# Patient Record
Sex: FEMALE | Race: WHITE | NOT HISPANIC OR LATINO
[De-identification: names, ages, dates, MRNs, and addresses within clinical notes are randomized per-mention and may not be internally consistent; named-entity substitution may affect disease eponyms.]

---

## 2022-08-17 ENCOUNTER — APPOINTMENT (OUTPATIENT)
Dept: CARDIOLOGY | Facility: CLINIC | Age: 52
End: 2022-08-17

## 2022-08-17 VITALS — DIASTOLIC BLOOD PRESSURE: 82 MMHG | SYSTOLIC BLOOD PRESSURE: 122 MMHG

## 2022-08-17 VITALS
TEMPERATURE: 97.5 F | DIASTOLIC BLOOD PRESSURE: 72 MMHG | HEART RATE: 79 BPM | BODY MASS INDEX: 32.78 KG/M2 | SYSTOLIC BLOOD PRESSURE: 120 MMHG | HEIGHT: 63 IN | OXYGEN SATURATION: 97 % | WEIGHT: 185 LBS

## 2022-08-17 DIAGNOSIS — Z80.3 FAMILY HISTORY OF MALIGNANT NEOPLASM OF BREAST: ICD-10-CM

## 2022-08-17 DIAGNOSIS — Z13.6 ENCOUNTER FOR SCREENING FOR CARDIOVASCULAR DISORDERS: ICD-10-CM

## 2022-08-17 DIAGNOSIS — Z78.9 OTHER SPECIFIED HEALTH STATUS: ICD-10-CM

## 2022-08-17 DIAGNOSIS — F17.210 NICOTINE DEPENDENCE, CIGARETTES, UNCOMPLICATED: ICD-10-CM

## 2022-08-17 DIAGNOSIS — Z84.89 FAMILY HISTORY OF OTHER SPECIFIED CONDITIONS: ICD-10-CM

## 2022-08-17 DIAGNOSIS — Z00.00 ENCOUNTER FOR GENERAL ADULT MEDICAL EXAMINATION W/OUT ABNORMAL FINDINGS: ICD-10-CM

## 2022-08-17 DIAGNOSIS — Z83.3 FAMILY HISTORY OF DIABETES MELLITUS: ICD-10-CM

## 2022-08-17 DIAGNOSIS — R00.2 PALPITATIONS: ICD-10-CM

## 2022-08-17 PROCEDURE — 99406 BEHAV CHNG SMOKING 3-10 MIN: CPT

## 2022-08-17 PROCEDURE — 93242 EXT ECG>48HR<7D RECORDING: CPT | Mod: 59

## 2022-08-17 PROCEDURE — 99204 OFFICE O/P NEW MOD 45 MIN: CPT

## 2022-08-17 RX ORDER — SPIRONOLACTONE 50 MG/1
50 TABLET ORAL
Qty: 90 | Refills: 0 | Status: DISCONTINUED | COMMUNITY
Start: 2022-05-03 | End: 2022-08-17

## 2022-08-17 NOTE — DISCUSSION/SUMMARY
[FreeTextEntry1] : 52-year-old female with above medical history and active medical problems as noted below\par 1.  Dyspnea on exertion as well as shortness of breath at rest.  Recent CT angio and chest x-ray negative for pulmonary embolism.  Active smoking.  CAD risk factors includes essential hypertension, active smoking.\par Obesity.  No clinical signs of volume overload.  Warm extremities.\par Recommendation includes labs.\par Echocardiogram for LV ejection fraction wall motion pericardial space and pulmonary artery systolic pressure evaluation specifically in presence of murmur, exertional dyspnea/shortness of breath hypertension.\par Exercise treadmill stress test in presence of abnormality of the EKG, multiple CAD risk factors and exertional shortness of breath.\par If about test does not show any significant cardiovascular etiology pulmonary evaluation which includes pulmonary function tests and follow-up on pulmonary nodule.\par Low-salt diet decrease in saturated fat carbohydrate intake increasing exercise weight reduction is strongly recommended.\par 2.  Essential hypertension very well controlled continue with losartan at present.  Goal less than 130/80.  Smoking cessation.\par Weight reduction.\par Low-salt diet\par 3.  Unclear lipid panel recommend a fasting lipid panel.  For restratification\par 4.  Obesity weight reduction\par 5.  Smoking.  Smoking cessation. Counseling done. Relationship with ASCVD, and associated morbidity, mortality was reviewed. Options available discussed.\par 6.  Lung nodule follow-up with pulmonary physician which has been already scheduled.\par #7 extrasystolic arrhythmias.  3-day event monitor ordered.\par Counseling regarding low saturated fat, salt and carbohydrate intake was reviewed. Active lifestyle and regular. Exercise along with weight management is advised.\par All the above were at length reviewed. Answered all the questions. Thank you very much for this kind referral. Please do not hesitate to give me a call for any question.\par Part of this transcription was done with voice recognition software and phonetically similar errors are common. I apologize for that. Please do not hesitate to call for any questions due to above.\par \par Sincerely,\par Cm Hassan MD,FACC,PIA\par

## 2022-08-17 NOTE — PHYSICAL EXAM
[Well Developed] : well developed [Well Nourished] : well nourished [No Acute Distress] : no acute distress [Obese] : obese [Normal Conjunctiva] : normal conjunctiva [Normal Venous Pressure] : normal venous pressure [No Carotid Bruit] : no carotid bruit [Normal S1, S2] : normal S1, S2 [No Rub] : no rub [No Gallop] : no gallop [Clear Lung Fields] : clear lung fields [Good Air Entry] : good air entry [No Respiratory Distress] : no respiratory distress  [Soft] : abdomen soft [Normal Gait] : normal gait [No Edema] : no edema [No Cyanosis] : no cyanosis [No Clubbing] : no clubbing [No Varicosities] : no varicosities [No Rash] : no rash [Moves all extremities] : moves all extremities [Normal Speech] : normal speech [Alert and Oriented] : alert and oriented [de-identified] : Midsystolic murmur.  At the base.  2/6.  Intermittent irregular beats

## 2022-08-17 NOTE — CARDIOLOGY SUMMARY
[de-identified] : 8/4/2022 outside EKG normal sinus rhythm left axis deviation LVH nonspecific IVCD.

## 2022-08-17 NOTE — ASSESSMENT
[FreeTextEntry1] : Reviewed on August 17, 2022.\par EKG from 8/4/2022 as noted above\par 2.  CT angio of the chest 8/4/2022.  No evidence of pulmonary embolus.  5.6 x 6.3 mm lung nodule right lung base.  Liver cyst.  No pericardial effusion.  They did not mention any significant evidence of vascular calcification.\par Chest x-ray no active cardiopulmonary disease.\par Sodium 140 potassium 3.6 creatinine 0.67.   COVID test was negative

## 2022-08-17 NOTE — REASON FOR VISIT
[Symptom and Test Evaluation] : symptom and test evaluation [Hypertension] : hypertension [FreeTextEntry3] : Dr. Keith [FreeTextEntry1] : 52-year-old white female is referred to me for consultation for worsening shortness of breath and recent emergency room visit for difficulty breathing.\par I have reviewed emergency room visit from [facility] dated 8/4/2022 with her.\par She has exertional and resting shortness of breath.  Without any associated chest pain.  No PND orthopnea.  She does have intermittent skipping beats and palpitations which are worse recently.  She has no significant dizziness near syncopal or syncopal event.\par She has no claudication pain.  She does not do regular exercise.  She has significant increased weight.\par She continues to smoke.\par No significant alcohol intake.\par \par Medical history includes\par Essential hypertension no history of CHF no history of CKD.  Active smoking\par Obesity\par

## 2022-08-24 ENCOUNTER — APPOINTMENT (OUTPATIENT)
Dept: MRI IMAGING | Facility: CLINIC | Age: 52
End: 2022-08-24

## 2022-08-24 ENCOUNTER — APPOINTMENT (OUTPATIENT)
Dept: CARDIOLOGY | Facility: CLINIC | Age: 52
End: 2022-08-24

## 2022-08-24 PROCEDURE — 93244 EXT ECG>48HR<7D REV&INTERPJ: CPT

## 2022-08-31 ENCOUNTER — APPOINTMENT (OUTPATIENT)
Dept: MRI IMAGING | Facility: CLINIC | Age: 52
End: 2022-08-31
Payer: COMMERCIAL

## 2022-08-31 ENCOUNTER — RESULT REVIEW (OUTPATIENT)
Age: 52
End: 2022-08-31

## 2022-08-31 PROCEDURE — 74183 MRI ABD W/O CNTR FLWD CNTR: CPT

## 2022-08-31 PROCEDURE — A9585: CPT | Mod: NC,JW

## 2022-09-07 ENCOUNTER — APPOINTMENT (OUTPATIENT)
Dept: CARDIOLOGY | Facility: CLINIC | Age: 52
End: 2022-09-07

## 2022-09-07 DIAGNOSIS — R06.02 SHORTNESS OF BREATH: ICD-10-CM

## 2022-09-07 PROCEDURE — 93015 CV STRESS TEST SUPVJ I&R: CPT

## 2022-09-07 PROCEDURE — 93306 TTE W/DOPPLER COMPLETE: CPT

## 2022-09-14 ENCOUNTER — APPOINTMENT (OUTPATIENT)
Dept: CT IMAGING | Facility: CLINIC | Age: 52
End: 2022-09-14

## 2022-09-14 ENCOUNTER — RESULT REVIEW (OUTPATIENT)
Age: 52
End: 2022-09-14

## 2022-09-14 PROCEDURE — 71260 CT THORAX DX C+: CPT

## 2022-09-28 ENCOUNTER — APPOINTMENT (OUTPATIENT)
Dept: CARDIOLOGY | Facility: CLINIC | Age: 52
End: 2022-09-28

## 2022-09-28 VITALS
OXYGEN SATURATION: 96 % | HEART RATE: 87 BPM | HEIGHT: 63 IN | WEIGHT: 191 LBS | BODY MASS INDEX: 33.84 KG/M2 | SYSTOLIC BLOOD PRESSURE: 114 MMHG | DIASTOLIC BLOOD PRESSURE: 64 MMHG

## 2022-09-28 DIAGNOSIS — R07.89 OTHER CHEST PAIN: ICD-10-CM

## 2022-09-28 DIAGNOSIS — I49.49 OTHER PREMATURE DEPOLARIZATION: ICD-10-CM

## 2022-09-28 PROCEDURE — 99214 OFFICE O/P EST MOD 30 MIN: CPT

## 2022-09-28 RX ORDER — NALTREXONE HYDROCHLORIDE 50 MG/1
50 TABLET, FILM COATED ORAL
Qty: 30 | Refills: 0 | Status: DISCONTINUED | COMMUNITY
Start: 2022-05-03 | End: 2022-09-28

## 2022-09-28 RX ORDER — ALPRAZOLAM 1 MG/1
1 TABLET, EXTENDED RELEASE ORAL
Qty: 30 | Refills: 0 | Status: DISCONTINUED | COMMUNITY
Start: 2022-05-03 | End: 2022-09-28

## 2022-09-28 NOTE — PHYSICAL EXAM
[Well Developed] : well developed [Well Nourished] : well nourished [No Acute Distress] : no acute distress [Obese] : obese [Normal Venous Pressure] : normal venous pressure [No Carotid Bruit] : no carotid bruit [Normal S1, S2] : normal S1, S2 [No Respiratory Distress] : no respiratory distress  [Normal Gait] : normal gait [No Edema] : no edema [No Cyanosis] : no cyanosis [No Clubbing] : no clubbing [No Rash] : no rash [Moves all extremities] : moves all extremities [Normal Speech] : normal speech [Alert and Oriented] : alert and oriented [Normal Radial B/L] : normal radial B/L [de-identified] : Midsystolic murmur.  At the base.  2/6.  Intermittent irregular beats

## 2022-09-28 NOTE — CARDIOLOGY SUMMARY
[de-identified] : 8/4/2022 outside EKG normal sinus rhythm left axis deviation LVH nonspecific IVCD. [de-identified] : Event monitor.  Almost 2 days.  Normal sinus rhythm average 68.  Occasional ectopy [de-identified] : September 7, 2022 7 minutes 30 seconds of Wes protocol.   chest pain.  8 METs of workload.  No evidence of exercise-induced ischemia. [de-identified] : September 7, 2022 EF 55 to 60% minimal mitral and aortic regurgitation moderately dilated left atrium mild left ventricular enlargement

## 2022-09-28 NOTE — REASON FOR VISIT
[Symptom and Test Evaluation] : symptom and test evaluation [Hypertension] : hypertension [FreeTextEntry3] : Dr. Keith [FreeTextEntry1] : 52-year-old female comes in for a consultation to review her cardiovascular test.  She was seen recently on August 17 in consultation for worsening shortness of breath and recent emergency room visit for difficulty breathing.\par I have reviewed emergency room visit from [facility] dated 8/4/2022 with her.\par She has exertional and resting shortness of breath.  Without any associated chest pain.  No PND orthopnea.  She does have intermittent skipping beats and palpitations which are worse recently.  She has no significant dizziness near syncopal or syncopal event.\par She has no claudication pain.  She does not do regular exercise.  She has significant increased weight.\par She continues to smoke.\par No significant alcohol intake.\par \par Medical history includes\par Essential hypertension no history of CHF no history of CKD.  Active smoking\par Obesity\par

## 2022-09-28 NOTE — ASSESSMENT
[FreeTextEntry1] : Reviewed on August 17, 2022.\par EKG from 8/4/2022 as noted above\par 2.  CT angio of the chest 8/4/2022.  No evidence of pulmonary embolus.  5.6 x 6.3 mm lung nodule right lung base.  Liver cyst.  No pericardial effusion.  They did not mention any significant evidence of vascular calcification.\par Chest x-ray no active cardiopulmonary disease.\par Sodium 140 potassium 3.6 creatinine 0.67.   COVID test was negative\par \par Reviewed on September 28, 2022.\par Echocardiogram/Holter monitor/exercise treadmill stress test were reviewed.

## 2022-09-28 NOTE — DISCUSSION/SUMMARY
[FreeTextEntry1] : 52-year-old female with above medical history and active medical problems as noted below\par 1.  Dyspnea on exertion as well as shortness of breath at rest.  Recent CT angio and chest x-ray negative for pulmonary embolism.  Active smoking.  CAD risk factors includes essential hypertension, active smoking.\par Obesity.  No clinical signs of volume overload.  Warm extremities.\par Echocardiogram shows mild cardiomegaly, left atrial enlargement with preserved EF.\par Exercise treadmill stress test she had chest pain.  EKGs were unchanged at that time.\par Holter monitor showed extrasystolic arrhythmia.  In the form of PVCs PACs.  No significant sustained abnormality\par Labs are pending.\par In presence of cardiomegaly chamber enlargement of unclear etiology, exercise-induced chest pain in presence of limitation of treadmill exercise stress test and ruling out significant obstructive CAD with multiple risk factors I have recommended her coronary CTA/heart with contrast to assess evaluate\par Coronary anatomy rule out any significant obstructive CAD\par Cardiac chamber dimension and ejection fraction\par Based on above test we will discuss further regarding evaluation and management of risk factors from preventive cardiovascular point of view.\par While evaluation is in progress I have also recommended her to take aspirin 81 mg once daily.\par 2.  Essential hypertension very well controlled continue with losartan at present.  Goal less than 130/80.  Smoking cessation.\par Weight reduction.\par Low-salt diet\par 3.  Unclear lipid panel recommend a fasting lipid panel.  For restratification\par 4.  Obesity weight reduction\par 5.  Smoking.  Smoking cessation. Counseling done. Relationship with ASCVD, and associated morbidity, mortality was reviewed. Options available discussed.\par 6.  Lung nodule.  Stable on repeat CT scan\par #7 extrasystolic arrhythmias.  Nonspecific.  Decided against starting any medication at present.\par \par Counseling regarding low saturated fat, salt and carbohydrate intake was reviewed. Active lifestyle and regular. Exercise along with weight management is advised.\par All the above were at length reviewed. Answered all the questions. Thank you very much for this kind referral. Please do not hesitate to give me a call for any question.\par Part of this transcription was done with voice recognition software and phonetically similar errors are common. I apologize for that. Please do not hesitate to call for any questions due to above.\par \par Sincerely,\par Cm Hassan MD,FACC,FASE\par

## 2022-10-14 ENCOUNTER — NON-APPOINTMENT (OUTPATIENT)
Age: 52
End: 2022-10-14

## 2022-11-18 ENCOUNTER — NON-APPOINTMENT (OUTPATIENT)
Age: 52
End: 2022-11-18

## 2023-01-03 ENCOUNTER — NON-APPOINTMENT (OUTPATIENT)
Age: 53
End: 2023-01-03

## 2023-01-07 ENCOUNTER — NON-APPOINTMENT (OUTPATIENT)
Age: 53
End: 2023-01-07

## 2023-03-05 ENCOUNTER — NON-APPOINTMENT (OUTPATIENT)
Age: 53
End: 2023-03-05

## 2023-04-05 ENCOUNTER — APPOINTMENT (OUTPATIENT)
Dept: FAMILY MEDICINE | Facility: CLINIC | Age: 53
End: 2023-04-05
Payer: COMMERCIAL

## 2023-04-05 ENCOUNTER — NON-APPOINTMENT (OUTPATIENT)
Age: 53
End: 2023-04-05

## 2023-04-05 VITALS
WEIGHT: 210 LBS | OXYGEN SATURATION: 97 % | TEMPERATURE: 98 F | BODY MASS INDEX: 37.21 KG/M2 | HEART RATE: 89 BPM | DIASTOLIC BLOOD PRESSURE: 90 MMHG | SYSTOLIC BLOOD PRESSURE: 124 MMHG | RESPIRATION RATE: 16 BRPM | HEIGHT: 63 IN

## 2023-04-05 PROCEDURE — G0444 DEPRESSION SCREEN ANNUAL: CPT | Mod: 59

## 2023-04-05 PROCEDURE — 99386 PREV VISIT NEW AGE 40-64: CPT | Mod: 25

## 2023-04-05 RX ORDER — LINACLOTIDE 290 UG/1
290 CAPSULE, GELATIN COATED ORAL
Qty: 90 | Refills: 0 | Status: DISCONTINUED | COMMUNITY
Start: 2022-05-03 | End: 2023-04-05

## 2023-04-05 NOTE — HISTORY OF PRESENT ILLNESS
[FreeTextEntry8] : DYANA is here to establish care with us as pt used to go to [facility] office \par Here to discuss sleep apnea and restless leg syndrome \par NKDa \par [facility] pharmacy

## 2023-04-05 NOTE — PHYSICAL EXAM
[No Acute Distress] : no acute distress [Well Nourished] : well nourished [Well Developed] : well developed [Well-Appearing] : well-appearing [Normal Sclera/Conjunctiva] : normal sclera/conjunctiva [PERRL] : pupils equal round and reactive to light [EOMI] : extraocular movements intact [Normal Outer Ear/Nose] : the outer ears and nose were normal in appearance [No JVD] : no jugular venous distention [No Lymphadenopathy] : no lymphadenopathy [Supple] : supple [Thyroid Normal, No Nodules] : the thyroid was normal and there were no nodules present [No Respiratory Distress] : no respiratory distress  [No Accessory Muscle Use] : no accessory muscle use [Clear to Auscultation] : lungs were clear to auscultation bilaterally [Normal Rate] : normal rate  [Regular Rhythm] : with a regular rhythm [Normal S1, S2] : normal S1 and S2 [No Murmur] : no murmur heard [Pedal Pulses Present] : the pedal pulses are present [No Edema] : there was no peripheral edema [Soft] : abdomen soft [Non Tender] : non-tender [Non-distended] : non-distended [Normal Posterior Cervical Nodes] : no posterior cervical lymphadenopathy [Normal Anterior Cervical Nodes] : no anterior cervical lymphadenopathy [No CVA Tenderness] : no CVA  tenderness [No Spinal Tenderness] : no spinal tenderness [No Joint Swelling] : no joint swelling [Grossly Normal Strength/Tone] : grossly normal strength/tone [No Rash] : no rash [Coordination Grossly Intact] : coordination grossly intact [No Focal Deficits] : no focal deficits [Normal Gait] : normal gait [Deep Tendon Reflexes (DTR)] : deep tendon reflexes were 2+ and symmetric [Normal Affect] : the affect was normal [Alert and Oriented x3] : oriented to person, place, and time [Normal Mood] : the mood was normal [Normal Insight/Judgement] : insight and judgment were intact

## 2023-04-05 NOTE — HEALTH RISK ASSESSMENT
[Yes] : Yes [Monthly or less (1 pt)] : Monthly or less (1 point) [1 or 2 (0 pts)] : 1 or 2 (0 points) [Never (0 pts)] : Never (0 points) [No] : In the past 12 months have you used drugs other than those required for medical reasons? No [0] : 1) Little interest or pleasure doing things: Not at all (0) [1] : 2) Feeling down, depressed, or hopeless for several days (1) [PHQ-2 Negative - No further assessment needed] : PHQ-2 Negative - No further assessment needed [Current] : Current [10-14] : 10-14 [Audit-CScore] : 1 [[id number]] : 1

## 2023-04-05 NOTE — PLAN
[FreeTextEntry1] : Rx: Sleep study. \par Rx: pramipexole 0.125 1 po QHS # 30\par RTO 1-3 months. \par Discussed loosing weight. \par Rx: Ozempic\par Will obtain blood work.

## 2023-04-26 ENCOUNTER — APPOINTMENT (OUTPATIENT)
Dept: CARDIOLOGY | Facility: CLINIC | Age: 53
End: 2023-04-26
Payer: COMMERCIAL

## 2023-05-10 ENCOUNTER — APPOINTMENT (OUTPATIENT)
Dept: FAMILY MEDICINE | Facility: CLINIC | Age: 53
End: 2023-05-10
Payer: COMMERCIAL

## 2023-05-10 ENCOUNTER — OFFICE (OUTPATIENT)
Dept: URBAN - METROPOLITAN AREA CLINIC 100 | Facility: CLINIC | Age: 53
Setting detail: OPHTHALMOLOGY
End: 2023-05-10

## 2023-05-10 VITALS
TEMPERATURE: 99 F | OXYGEN SATURATION: 98 % | WEIGHT: 208.2 LBS | DIASTOLIC BLOOD PRESSURE: 80 MMHG | HEIGHT: 63 IN | SYSTOLIC BLOOD PRESSURE: 132 MMHG | RESPIRATION RATE: 16 BRPM | HEART RATE: 91 BPM | BODY MASS INDEX: 36.89 KG/M2

## 2023-05-10 DIAGNOSIS — H18.823: ICD-10-CM

## 2023-05-10 DIAGNOSIS — H02.014: ICD-10-CM

## 2023-05-10 PROCEDURE — 99214 OFFICE O/P EST MOD 30 MIN: CPT

## 2023-05-10 PROCEDURE — 67820 REVISE EYELASHES: CPT | Performed by: OPHTHALMOLOGY

## 2023-05-10 PROCEDURE — 99203 OFFICE O/P NEW LOW 30 MIN: CPT | Performed by: OPHTHALMOLOGY

## 2023-05-10 ASSESSMENT — AXIALLENGTH_DERIVED
OD_AL: 26.54
OS_AL: 26.66

## 2023-05-10 ASSESSMENT — REFRACTION_AUTOREFRACTION
OD_AXIS: 046
OD_SPHERE: -10.25
OS_CYLINDER: -1.50
OS_AXIS: 091
OD_CYLINDER: -0.75
OS_SPHERE: -9.75

## 2023-05-10 ASSESSMENT — REFRACTION_CURRENTRX
OS_CYLINDER: -0.75
OD_OVR_VA: 20/
OS_SPHERE: -9.75
OD_VPRISM_DIRECTION: SV
OS_OVR_VA: 20/
OD_SPHERE: -9.75
OS_AXIS: 075
OD_CYLINDER: -0.50
OS_VPRISM_DIRECTION: SV
OD_AXIS: 065

## 2023-05-10 ASSESSMENT — KERATOMETRY
OS_K2POWER_DIOPTERS: 47.25
OD_K1POWER_DIOPTERS: 47.00
OS_AXISANGLE_DEGREES: 0
OD_AXISANGLE_DEGREES: 016
OS_K1POWER_DIOPTERS: 47.25
OD_K2POWER_DIOPTERS: 48.25

## 2023-05-10 ASSESSMENT — SPHEQUIV_DERIVED
OD_SPHEQUIV: -10.625
OS_SPHEQUIV: -10.5

## 2023-05-10 ASSESSMENT — TONOMETRY
OD_IOP_MMHG: 12
OS_IOP_MMHG: 12

## 2023-05-10 ASSESSMENT — CONFRONTATIONAL VISUAL FIELD TEST (CVF)
OS_FINDINGS: FULL
OD_FINDINGS: FULL

## 2023-05-10 ASSESSMENT — LID EXAM ASSESSMENTS: OS_TRICHIASIS: LUL

## 2023-05-10 ASSESSMENT — VISUAL ACUITY
OD_BCVA: 20/30-
OS_BCVA: 20/60-

## 2023-05-16 NOTE — HISTORY OF PRESENT ILLNESS
[FreeTextEntry1] : Follow up on restless leg.  Discuss depression\par NKDA\par [facility] [de-identified] : She is having problems adjusting with death of parents and moving. \par She has never taken an antidepressant before.

## 2023-05-16 NOTE — HEALTH RISK ASSESSMENT
[3] : 2) Feeling down, depressed, or hopeless for nearly every day (3) [PHQ-2 Positive] : PHQ-2 Positive [[id number]] : 3

## 2023-06-07 ENCOUNTER — APPOINTMENT (OUTPATIENT)
Dept: FAMILY MEDICINE | Facility: CLINIC | Age: 53
End: 2023-06-07
Payer: COMMERCIAL

## 2023-06-07 ENCOUNTER — APPOINTMENT (OUTPATIENT)
Dept: CARDIOLOGY | Facility: CLINIC | Age: 53
End: 2023-06-07
Payer: COMMERCIAL

## 2023-06-07 ENCOUNTER — NON-APPOINTMENT (OUTPATIENT)
Age: 53
End: 2023-06-07

## 2023-06-07 VITALS
TEMPERATURE: 98.2 F | SYSTOLIC BLOOD PRESSURE: 124 MMHG | OXYGEN SATURATION: 98 % | DIASTOLIC BLOOD PRESSURE: 90 MMHG | HEIGHT: 63 IN | BODY MASS INDEX: 37.39 KG/M2 | WEIGHT: 211 LBS | RESPIRATION RATE: 16 BRPM | HEART RATE: 87 BPM

## 2023-06-07 VITALS
OXYGEN SATURATION: 95 % | HEIGHT: 63 IN | BODY MASS INDEX: 37.74 KG/M2 | SYSTOLIC BLOOD PRESSURE: 140 MMHG | DIASTOLIC BLOOD PRESSURE: 82 MMHG | HEART RATE: 83 BPM | WEIGHT: 213 LBS

## 2023-06-07 DIAGNOSIS — R94.31 ABNORMAL ELECTROCARDIOGRAM [ECG] [EKG]: ICD-10-CM

## 2023-06-07 PROCEDURE — 99214 OFFICE O/P EST MOD 30 MIN: CPT

## 2023-06-07 PROCEDURE — 99214 OFFICE O/P EST MOD 30 MIN: CPT | Mod: 25

## 2023-06-07 PROCEDURE — 93000 ELECTROCARDIOGRAM COMPLETE: CPT

## 2023-06-07 RX ORDER — SEMAGLUTIDE 1.34 MG/ML
2 INJECTION, SOLUTION SUBCUTANEOUS
Qty: 1 | Refills: 0 | Status: DISCONTINUED | COMMUNITY
Start: 2023-04-05 | End: 2023-06-07

## 2023-06-07 NOTE — REVIEW OF SYSTEMS
[Anxiety] : anxiety [Depression] : depression [Negative] : Heme/Lymph [de-identified] : Restless leg

## 2023-06-07 NOTE — DISCUSSION/SUMMARY
[FreeTextEntry1] : [age]-year-old female with above medical history and active medical problems as noted below\par 1.  Dyspnea on exertion as well as shortness of breath at rest.  Recent CT angio and chest x-ray negative for pulmonary embolism.  Active smoking.  CAD risk factors includes essential hypertension, active smoking.\par Abnormal EKG.  Mild cardiomegaly on echocardiogram.  Otherwise normal wall motion.  Nonischemic stress test.  Nonspecific IVCD in presence of hypertension.  Continue to follow-up.\par Echocardiogram shows mild cardiomegaly, left atrial enlargement with preserved EF.\par Exercise treadmill stress test she had chest pain.  EKGs were unchanged at that time.\par Holter monitor showed extrasystolic arrhythmia.  In the form of PVCs PACs.  No significant sustained abnormality\par In presence of intermittent dizziness I have ordered 7-day event monitor.\par Further restratification with coronary calcium score.\par Hydration.\par Increasing activity.\par Weight reduction.\par If continued symptoms and no other abnormality noted may need to consider repeat echocardiogram, and consider coronary CTA for further restratification.\par 2.  Essential hypertension borderline controlled continue with losartan at present.  Maximize next office visit as long as no significant dizziness.  There is no significant postural hypotension.  Goal less than 130/80.  Smoking cessation.\par Weight reduction.\par Low-salt diet\par 3.  Cardiomegaly.  Continue blood pressure control.  She has depression not started beta-blocker.  Continue ARB.  Follow-up echocardiogram.  Overall no signs of volume overload.  Warm extremities.  As discussed above if coronary calcium score is abnormal we will go with coronary CTA.  If normal consider cardiac MRI for further quantification of LV EF dimensions and rule out any myocardial disease.\par 5.  Smoking.  Smoking cessation. Counseling done. Relationship with ASCVD, and associated morbidity, mortality was reviewed. Options available discussed.\par 6 extrasystolic arrhythmias.  Nonspecific.  Decided against starting any medication at present.\par \par Counseling regarding low saturated fat, salt and carbohydrate intake was reviewed. Active lifestyle and regular. Exercise along with weight management is advised.\par All the above were at length reviewed. Answered all the questions. Thank you very much for this kind referral. Please do not hesitate to give me a call for any question.\par Part of this transcription was done with voice recognition software and phonetically similar errors are common. I apologize for that. Please do not hesitate to call for any questions due to above.\par \par Sincerely,\par Cm Hassan MD,FACC,PIA\par

## 2023-06-07 NOTE — PHYSICAL EXAM
[Well Developed] : well developed [Well Nourished] : well nourished [No Acute Distress] : no acute distress [Obese] : obese [Normal Venous Pressure] : normal venous pressure [No Carotid Bruit] : no carotid bruit [Normal S1, S2] : normal S1, S2 [No Respiratory Distress] : no respiratory distress  [Normal Gait] : normal gait [No Edema] : no edema [No Cyanosis] : no cyanosis [No Clubbing] : no clubbing [Normal Radial B/L] : normal radial B/L [No Rash] : no rash [Moves all extremities] : moves all extremities [Normal Speech] : normal speech [Alert and Oriented] : alert and oriented [de-identified] : Midsystolic murmur.  At the base.  2/6.  Intermittent irregular beats

## 2023-06-07 NOTE — CARDIOLOGY SUMMARY
[de-identified] : 8/4/2022 outside EKG normal sinus rhythm left axis deviation LVH nonspecific IVCD.\par EKG 6/7/2023.  Normal sinus rhythm left axis deviation LVH nonspecific IVCD.  EKG from outside 6/2/2023 also was reviewed. [de-identified] : Event monitor.  Almost 2 days.  Normal sinus rhythm average 68.  Occasional ectopy [de-identified] : September 7, 2022 7 minutes 30 seconds of Wes protocol.   chest pain.  8 METs of workload.  No evidence of exercise-induced ischemia. [de-identified] : September 7, 2022 EF 55 to 60% minimal mitral and aortic regurgitation moderately dilated left atrium mild left ventricular enlargement

## 2023-06-07 NOTE — HISTORY OF PRESENT ILLNESS
[FreeTextEntry1] : DYANA is here to f/u on restless leg syndrome, ozempic and anti depressant and anxiety \par states the escitalopram and clonazepam are helping her  \par She would like to discuss changing ozempic because insurance denied it\par seen at [facility] ER 6/2/23 after feeling light headed due to seizure concern- did not have a seizure states all tests were normal except CT with white portions in brain, MRI suggested \par NKDA \par [place] [place] [de-identified] : DYANA states she was recently in ED [facility] 6/2/2023 for possible seizure. It was noted in MRI that she had some whit matter changes. She will be following up with neurology.

## 2023-06-07 NOTE — COUNSELING
[Cessation strategies including cessation program discussed] : Cessation strategies including cessation program discussed [Use of nicotine replacement therapies and other medications discussed] : Use of nicotine replacement therapies and other medications discussed [Encouraged to pick a quit date and identify support needed to quit] : Encouraged to pick a quit date and identify support needed to quit [No] : Not willing to quit smoking [FreeTextEntry1] : 3

## 2023-06-07 NOTE — PLAN
[FreeTextEntry1] : Medications renewed. \par Increased Pramipexole\par Rx: Wegovy. \par Rx: Mammo\par Will f//u with Neurology for white matter changes. \par Reviewed Sleep study.

## 2023-06-07 NOTE — REVIEW OF SYSTEMS
[Negative] : Heme/Lymph [FreeTextEntry2] : As noted above [FreeTextEntry5] : As noted above [de-identified] : As per HPI

## 2023-06-07 NOTE — ASSESSMENT
[FreeTextEntry1] : Reviewed on June 7, 2023.\par Emergency room information reviewed.\par EKG from today reviewed

## 2023-06-12 ENCOUNTER — NON-APPOINTMENT (OUTPATIENT)
Age: 53
End: 2023-06-12

## 2023-06-26 ENCOUNTER — NON-APPOINTMENT (OUTPATIENT)
Age: 53
End: 2023-06-26

## 2023-07-07 ENCOUNTER — NON-APPOINTMENT (OUTPATIENT)
Age: 53
End: 2023-07-07

## 2023-07-12 ENCOUNTER — APPOINTMENT (OUTPATIENT)
Dept: FAMILY MEDICINE | Facility: CLINIC | Age: 53
End: 2023-07-12
Payer: COMMERCIAL

## 2023-07-12 VITALS
RESPIRATION RATE: 16 BRPM | OXYGEN SATURATION: 97 % | HEIGHT: 63 IN | DIASTOLIC BLOOD PRESSURE: 82 MMHG | WEIGHT: 214.4 LBS | SYSTOLIC BLOOD PRESSURE: 146 MMHG | HEART RATE: 98 BPM | BODY MASS INDEX: 37.99 KG/M2 | TEMPERATURE: 98.2 F

## 2023-07-12 DIAGNOSIS — Z99.89 OBSTRUCTIVE SLEEP APNEA (ADULT) (PEDIATRIC): ICD-10-CM

## 2023-07-12 DIAGNOSIS — G47.33 OBSTRUCTIVE SLEEP APNEA (ADULT) (PEDIATRIC): ICD-10-CM

## 2023-07-12 PROCEDURE — 99214 OFFICE O/P EST MOD 30 MIN: CPT

## 2023-07-12 RX ORDER — NICOTINE 4 MG/1
10 INHALANT RESPIRATORY (INHALATION) 4 TIMES DAILY
Qty: 1 | Refills: 2 | Status: ACTIVE | COMMUNITY
Start: 2023-07-12 | End: 1900-01-01

## 2023-07-12 NOTE — PLAN
[FreeTextEntry1] : Medications renewed. \par Rx: Ambien. \par Encourage Continued use C-Pap\par Discussed Smoking cessation. Cannot use patches. \par Rx: Nicotrol

## 2023-07-12 NOTE — HISTORY OF PRESENT ILLNESS
[FreeTextEntry1] : Follow up to discuss difficulty sleeping, smoking cessation, sleep apnea and rx for Wegovy and Doxepin. Refills on Lamotrigine.\par NKDA\par [facility] [de-identified] : DYANA states she has been using CPAP and sleeping well.

## 2023-07-26 ENCOUNTER — NON-APPOINTMENT (OUTPATIENT)
Age: 53
End: 2023-07-26

## 2023-07-26 ENCOUNTER — APPOINTMENT (OUTPATIENT)
Dept: FAMILY MEDICINE | Facility: CLINIC | Age: 53
End: 2023-07-26
Payer: COMMERCIAL

## 2023-07-26 PROCEDURE — 36415 COLL VENOUS BLD VENIPUNCTURE: CPT

## 2023-08-04 LAB
ALBUMIN SERPL ELPH-MCNC: 4.7 G/DL
ALP BLD-CCNC: 113 U/L
ALT SERPL-CCNC: 24 U/L
ANION GAP SERPL CALC-SCNC: 14 MMOL/L
AST SERPL-CCNC: 15 U/L
BASOPHILS # BLD AUTO: 0.06 K/UL
BASOPHILS NFR BLD AUTO: 0.7 %
BILIRUB SERPL-MCNC: 0.5 MG/DL
BUN SERPL-MCNC: 9 MG/DL
CALCIUM SERPL-MCNC: 9.5 MG/DL
CHLORIDE SERPL-SCNC: 102 MMOL/L
CHOLEST SERPL-MCNC: 171 MG/DL
CO2 SERPL-SCNC: 23 MMOL/L
CREAT SERPL-MCNC: 0.79 MG/DL
EGFR: 91 ML/MIN/1.73M2
EOSINOPHIL # BLD AUTO: 0.07 K/UL
EOSINOPHIL NFR BLD AUTO: 0.8 %
ESTIMATED AVERAGE GLUCOSE: 103 MG/DL
GLUCOSE SERPL-MCNC: 85 MG/DL
HBA1C MFR BLD HPLC: 5.2 %
HCT VFR BLD CALC: 42.8 %
HDLC SERPL-MCNC: 60 MG/DL
HGB BLD-MCNC: 14 G/DL
IMM GRANULOCYTES NFR BLD AUTO: 0.2 %
LDLC SERPL CALC-MCNC: 93 MG/DL
LYMPHOCYTES # BLD AUTO: 1.7 K/UL
LYMPHOCYTES NFR BLD AUTO: 19.8 %
MAN DIFF?: NORMAL
MCHC RBC-ENTMCNC: 28.6 PG
MCHC RBC-ENTMCNC: 32.7 GM/DL
MCV RBC AUTO: 87.5 FL
MONOCYTES # BLD AUTO: 0.55 K/UL
MONOCYTES NFR BLD AUTO: 6.4 %
NEUTROPHILS # BLD AUTO: 6.2 K/UL
NEUTROPHILS NFR BLD AUTO: 72.1 %
NONHDLC SERPL-MCNC: 111 MG/DL
PLATELET # BLD AUTO: 349 K/UL
POTASSIUM SERPL-SCNC: 5 MMOL/L
PROT SERPL-MCNC: 7.3 G/DL
RBC # BLD: 4.89 M/UL
RBC # FLD: 14 %
SODIUM SERPL-SCNC: 140 MMOL/L
TRIGL SERPL-MCNC: 95 MG/DL
TSH SERPL-ACNC: 0.78 UIU/ML
WBC # FLD AUTO: 8.6 K/UL

## 2023-08-09 ENCOUNTER — APPOINTMENT (OUTPATIENT)
Dept: FAMILY MEDICINE | Facility: CLINIC | Age: 53
End: 2023-08-09
Payer: COMMERCIAL

## 2023-08-09 VITALS
DIASTOLIC BLOOD PRESSURE: 84 MMHG | RESPIRATION RATE: 16 BRPM | SYSTOLIC BLOOD PRESSURE: 126 MMHG | HEART RATE: 77 BPM | HEIGHT: 63 IN | BODY MASS INDEX: 37.21 KG/M2 | WEIGHT: 210 LBS | OXYGEN SATURATION: 98 %

## 2023-08-09 DIAGNOSIS — R42 DIZZINESS AND GIDDINESS: ICD-10-CM

## 2023-08-09 PROCEDURE — 99214 OFFICE O/P EST MOD 30 MIN: CPT

## 2023-08-09 RX ORDER — DOXEPIN 6 MG/1
6 TABLET, FILM COATED ORAL
Qty: 30 | Refills: 0 | Status: DISCONTINUED | COMMUNITY
End: 2023-08-09

## 2023-08-09 NOTE — HISTORY OF PRESENT ILLNESS
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   [de-identified] : DYANA recently was in ED for vertigo. CVA was ruled out. Cyst was noted. She has not attempted to stop smoking. Will try nicotrol today.  ED note reviewed.

## 2023-08-09 NOTE — PLAN
[FreeTextEntry1] : Rx: Pramipexole 0.125 mg 1 po four times daily  Encourage Colace.  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

## 2023-08-16 ENCOUNTER — APPOINTMENT (OUTPATIENT)
Dept: CARDIOLOGY | Facility: CLINIC | Age: 53
End: 2023-08-16
Payer: COMMERCIAL

## 2023-08-16 VITALS
DIASTOLIC BLOOD PRESSURE: 70 MMHG | HEIGHT: 63 IN | WEIGHT: 210 LBS | BODY MASS INDEX: 37.21 KG/M2 | SYSTOLIC BLOOD PRESSURE: 118 MMHG | HEART RATE: 68 BPM | OXYGEN SATURATION: 96 %

## 2023-08-16 DIAGNOSIS — I25.10 ATHEROSCLEROTIC HEART DISEASE OF NATIVE CORONARY ARTERY W/OUT ANGINA PECTORIS: ICD-10-CM

## 2023-08-16 DIAGNOSIS — Z87.891 PERSONAL HISTORY OF NICOTINE DEPENDENCE: ICD-10-CM

## 2023-08-16 DIAGNOSIS — I51.7 CARDIOMEGALY: ICD-10-CM

## 2023-08-16 PROCEDURE — 99215 OFFICE O/P EST HI 40 MIN: CPT | Mod: 25

## 2023-08-16 PROCEDURE — 93000 ELECTROCARDIOGRAM COMPLETE: CPT

## 2023-08-16 RX ORDER — LOSARTAN POTASSIUM 25 MG/1
25 TABLET, FILM COATED ORAL
Qty: 90 | Refills: 3 | Status: DISCONTINUED | COMMUNITY
Start: 2022-05-03 | End: 2023-08-16

## 2023-08-16 NOTE — CARDIOLOGY SUMMARY
[de-identified] : 8/4/2022 outside EKG normal sinus rhythm left axis deviation LVH nonspecific IVCD.\par  EKG 6/7/2023.  Normal sinus rhythm left axis deviation LVH nonspecific IVCD.  EKG from outside 6/2/2023 also was reviewed. [de-identified] : Event monitor.  Almost 2 days.  Normal sinus rhythm average 68.  Occasional ectopy [de-identified] : September 7, 2022 7 minutes 30 seconds of Wes protocol.   chest pain.  8 METs of workload.  No evidence of exercise-induced ischemia. [de-identified] : September 7, 2022 EF 55 to 60% minimal mitral and aortic regurgitation moderately dilated left atrium mild left ventricular enlargement [de-identified] : CT heart without IV contrast.  Coronary calcium score of 2.  Dilated main pulmonary arteries.

## 2023-08-16 NOTE — REVIEW OF SYSTEMS
[Negative] : Heme/Lymph [FreeTextEntry2] : As noted above [FreeTextEntry5] : As noted above [de-identified] : As per HPI

## 2023-08-16 NOTE — DISCUSSION/SUMMARY
[FreeTextEntry1] : [age]-year-old female with above medical history and active medical problems as noted below 1.  Dyspnea on exertion as well as shortness of breath at rest.  Recent CT angio and chest x-ray negative for pulmonary embolism.  Active smoking.  CAD risk factors includes essential hypertension, active smoking. Abnormal EKG.  Mild cardiomegaly on echocardiogram.  Otherwise normal wall motion.  Nonischemic stress test.  Nonspecific IVCD in presence of hypertension.  Continue to follow-up. Echocardiogram shows mild cardiomegaly, left atrial enlargement with preserved EF. Exercise treadmill stress test she had chest pain.  EKGs were unchanged at that time. Holter monitor showed extrasystolic arrhythmia.  In the form of PVCs PACs.  No significant sustained abnormality Coronary calcium score of 2.  Minimal.  There is dilated pulmonary arteries in relation to sleep apnea, obesity and hypertension.  Follow-up echocardiogram will be done Hydration. Increasing activity. Weight reduction. Continued smoking cessation.  Exercise and weight reduction. 2.  Essential hypertension.  Very well controlled.  Considering her quitting smoking, increasing exercise and weight reduction we will stop losartan and see whether she can control her blood pressure with lifestyle modification plus use of metoprolol which is being given for extrasystolic symptomatic arrhythmias. There is no significant postural hypotension.  Goal less than 130/80.  Smoking cessation. Weight reduction. Low-salt diet 3.  Cardiomegaly.  Continue blood pressure control.   continue metoprolol.  Preserved EF.  Class I functional status.  Follow-up echocardiogram.  Overall no signs of volume overload.  Warm extremities.   If echocardiogram is abnormal again with left ventricular enlargement then consider cardiac MRI for further quantification of LV EF dimensions and rule out any myocardial disease. 5.  Smoking.  Smoking cessation. Counseling done. Relationship with ASCVD, and associated morbidity, mortality was reviewed. Options available discussed. 6 extrasystolic arrhythmias.  Nonspecific.  Response to metoprolol at 25 mg.  We will continue present regimen of medications. 7.  Sleep apnea.  Continue use of CPAP. 8.  Coronary calcification score of 2.  Suggestive of mild coronary calcification/atherosclerosis.  Continue lifestyle and risk factor modification.  Fasting lipid panel in 6 months with lifestyle modification.  As needed starting statin therapy if LDL cholesterol remains greater than 70.  Counseling regarding low saturated fat, salt and carbohydrate intake was reviewed. Active lifestyle and regular. Exercise along with weight management is advised. All the above were at length reviewed. Answered all the questions. Thank you very much for this kind referral. Please do not hesitate to give me a call for any question. Part of this transcription was done with voice recognition software and phonetically similar errors are common. I apologize for that. Please do not hesitate to call for any questions due to above.  Sincerely, Cm Hassan MD,FACC,[facility]

## 2023-08-16 NOTE — COUNSELING
[Cessation strategies including cessation program discussed] : Cessation strategies including cessation program discussed [Use of nicotine replacement therapies and other medications discussed] : Use of nicotine replacement therapies and other medications discussed [Encouraged to pick a quit date and identify support needed to quit] : Encouraged to pick a quit date and identify support needed to quit [Yes] : Willing to quit smoking [FreeTextEntry2] : She quit [FreeTextEntry1] : 3

## 2023-08-16 NOTE — PHYSICAL EXAM
[Well Developed] : well developed [Well Nourished] : well nourished [No Acute Distress] : no acute distress [Obese] : obese [Normal Venous Pressure] : normal venous pressure [No Carotid Bruit] : no carotid bruit [Normal S1, S2] : normal S1, S2 [No Respiratory Distress] : no respiratory distress  [Normal Gait] : normal gait [No Edema] : no edema [No Cyanosis] : no cyanosis [No Clubbing] : no clubbing [Normal Radial B/L] : normal radial B/L [No Rash] : no rash [Moves all extremities] : moves all extremities [Normal Speech] : normal speech [Alert and Oriented] : alert and oriented [de-identified] : Midsystolic murmur.  At the base.  2/6.  Intermittent irregular beats

## 2023-08-16 NOTE — REASON FOR VISIT
No [Symptom and Test Evaluation] : symptom and test evaluation [Hypertension] : hypertension [FreeTextEntry3] : Dr. Keith/Yonny [FreeTextEntry1] : 53-year-old female comes in for follow-up consultation with recent hospital admission with complaint o dizziness.  She had MRI of the brain/CT scan of the brain/EKG/chest x-ray/labs.  Diagnosis of vertigo was made.  She is to be seen by ear nose and throat physician. Over the last few weeks she has stopped smoking.  She has been walking 20 minutes with her dog.  She has been controlling her diet and has lost weight. Her palpitations are better with use of metoprolol at a higher dose At present I have reviewed her coronary CT scan, EKG, chest x-ray, CT scan of the brain, MRI of the brain, labs which were done recently. She has been also using her CPAP on a regular basis. She has been taking her medications regularly This was without any associated chest pain.  No PND orthopnea.   She has no claudication pain.   No significant alcohol intake.  Medical history includes Essential hypertension no history of CHF no history of CKD.  Active smoking Obesity Sleep apnea CCS score 2 Abnormal EKG

## 2023-10-25 ENCOUNTER — APPOINTMENT (OUTPATIENT)
Dept: FAMILY MEDICINE | Facility: CLINIC | Age: 53
End: 2023-10-25
Payer: COMMERCIAL

## 2023-10-25 VITALS
RESPIRATION RATE: 16 BRPM | HEART RATE: 80 BPM | DIASTOLIC BLOOD PRESSURE: 80 MMHG | TEMPERATURE: 98.6 F | BODY MASS INDEX: 34.73 KG/M2 | HEIGHT: 63 IN | SYSTOLIC BLOOD PRESSURE: 134 MMHG | WEIGHT: 196 LBS | OXYGEN SATURATION: 98 %

## 2023-10-25 DIAGNOSIS — Z00.00 ENCOUNTER FOR GENERAL ADULT MEDICAL EXAMINATION W/OUT ABNORMAL FINDINGS: ICD-10-CM

## 2023-10-25 DIAGNOSIS — Z23 ENCOUNTER FOR IMMUNIZATION: ICD-10-CM

## 2023-10-25 DIAGNOSIS — E66.9 OBESITY, UNSPECIFIED: ICD-10-CM

## 2023-10-25 DIAGNOSIS — I10 ESSENTIAL (PRIMARY) HYPERTENSION: ICD-10-CM

## 2023-10-25 DIAGNOSIS — F32.9 MAJOR DEPRESSIVE DISORDER, SINGLE EPISODE, UNSPECIFIED: ICD-10-CM

## 2023-10-25 PROCEDURE — 90686 IIV4 VACC NO PRSV 0.5 ML IM: CPT

## 2023-10-25 PROCEDURE — 99214 OFFICE O/P EST MOD 30 MIN: CPT | Mod: 25

## 2023-10-25 PROCEDURE — G0008: CPT

## 2023-10-25 RX ORDER — METOPROLOL SUCCINATE 25 MG/1
25 TABLET, EXTENDED RELEASE ORAL DAILY
Qty: 90 | Refills: 1 | Status: ACTIVE | COMMUNITY
Start: 2023-06-27 | End: 1900-01-01

## 2023-11-20 ENCOUNTER — RX RENEWAL (OUTPATIENT)
Age: 53
End: 2023-11-20

## 2023-11-30 ENCOUNTER — APPOINTMENT (OUTPATIENT)
Dept: AFTER HOURS CARE | Facility: EMERGENCY ROOM | Age: 53
End: 2023-11-30
Payer: COMMERCIAL

## 2023-11-30 ENCOUNTER — TRANSCRIPTION ENCOUNTER (OUTPATIENT)
Age: 53
End: 2023-11-30

## 2023-11-30 PROCEDURE — 99205 OFFICE O/P NEW HI 60 MIN: CPT | Mod: 95

## 2023-12-27 ENCOUNTER — APPOINTMENT (OUTPATIENT)
Dept: FAMILY MEDICINE | Facility: CLINIC | Age: 53
End: 2023-12-27
Payer: COMMERCIAL

## 2023-12-27 VITALS
OXYGEN SATURATION: 98 % | HEIGHT: 63 IN | BODY MASS INDEX: 32.67 KG/M2 | TEMPERATURE: 98.2 F | DIASTOLIC BLOOD PRESSURE: 80 MMHG | RESPIRATION RATE: 16 BRPM | HEART RATE: 90 BPM | SYSTOLIC BLOOD PRESSURE: 130 MMHG | WEIGHT: 184.38 LBS

## 2023-12-27 DIAGNOSIS — G47.00 INSOMNIA, UNSPECIFIED: ICD-10-CM

## 2023-12-27 DIAGNOSIS — G25.81 RESTLESS LEGS SYNDROME: ICD-10-CM

## 2023-12-27 PROCEDURE — 99214 OFFICE O/P EST MOD 30 MIN: CPT

## 2023-12-27 RX ORDER — CLONAZEPAM 0.5 MG/1
0.5 TABLET ORAL
Qty: 60 | Refills: 0 | Status: ACTIVE | COMMUNITY
Start: 2023-05-10 | End: 1900-01-01

## 2023-12-27 RX ORDER — FAMOTIDINE 40 MG/1
40 TABLET, FILM COATED ORAL
Refills: 0 | Status: ACTIVE | COMMUNITY

## 2023-12-27 NOTE — HISTORY OF PRESENT ILLNESS
[FreeTextEntry1] : Patient in office for 2-month f/u visit last seen 10/25/2023. Request renewal of medication Klonopin and Pramipexole. [de-identified] : DYANA states she feels otherwise in good health.

## 2024-01-19 RX ORDER — ZOLPIDEM TARTRATE 10 MG/1
10 TABLET ORAL
Qty: 30 | Refills: 1 | Status: ACTIVE | COMMUNITY
Start: 2023-07-12 | End: 1900-01-01

## 2024-01-19 RX ORDER — LAMOTRIGINE 200 MG/1
200 TABLET ORAL
Qty: 60 | Refills: 0 | Status: ACTIVE | COMMUNITY
Start: 2022-05-03 | End: 1900-01-01

## 2024-01-19 RX ORDER — PRAMIPEXOLE DIHYDROCHLORIDE 0.25 MG/1
0.25 TABLET ORAL 3 TIMES DAILY
Qty: 90 | Refills: 0 | Status: ACTIVE | COMMUNITY
Start: 2023-04-05 | End: 1900-01-01

## 2024-01-19 RX ORDER — SEMAGLUTIDE 2.4 MG/.75ML
2.4 INJECTION, SOLUTION SUBCUTANEOUS
Qty: 1 | Refills: 0 | Status: ACTIVE | COMMUNITY
Start: 2023-06-07 | End: 1900-01-01

## 2024-01-19 RX ORDER — ESCITALOPRAM OXALATE 10 MG/1
10 TABLET ORAL
Qty: 90 | Refills: 0 | Status: ACTIVE | COMMUNITY
Start: 2023-05-10 | End: 1900-01-01

## 2024-01-26 NOTE — ASSESSMENT
[FreeTextEntry1] : Reviewed on 8/16/23  Emergency room information reviewed. EKG , xray, ct scan, MRI, labs, CCS score    Hpi Title: Evaluation of Skin Lesions How Severe Are Your Spot(S)?: mild

## 2024-02-14 ENCOUNTER — APPOINTMENT (OUTPATIENT)
Dept: CARDIOLOGY | Facility: CLINIC | Age: 54
End: 2024-02-14

## 2024-09-05 ENCOUNTER — APPOINTMENT (OUTPATIENT)
Dept: RADIOLOGY | Facility: CLINIC | Age: 54
End: 2024-09-05

## 2024-09-05 PROCEDURE — 73070 X-RAY EXAM OF ELBOW: CPT | Mod: LT

## 2024-09-05 PROCEDURE — 73120 X-RAY EXAM OF HAND: CPT | Mod: LT

## 2024-09-05 PROCEDURE — 72040 X-RAY EXAM NECK SPINE 2-3 VW: CPT

## 2024-09-05 PROCEDURE — 71101 X-RAY EXAM UNILAT RIBS/CHEST: CPT | Mod: LT

## 2024-09-05 PROCEDURE — 73030 X-RAY EXAM OF SHOULDER: CPT | Mod: LT

## 2024-10-05 ENCOUNTER — APPOINTMENT (OUTPATIENT)
Dept: MRI IMAGING | Facility: CLINIC | Age: 54
End: 2024-10-05

## 2024-10-05 PROCEDURE — 73221 MRI JOINT UPR EXTREM W/O DYE: CPT | Mod: LT

## 2025-01-22 ENCOUNTER — RESULT REVIEW (OUTPATIENT)
Age: 55
End: 2025-01-22

## 2025-01-22 ENCOUNTER — APPOINTMENT (OUTPATIENT)
Dept: MRI IMAGING | Facility: CLINIC | Age: 55
End: 2025-01-22
Payer: SELF-PAY

## 2025-01-22 PROCEDURE — 73221 MRI JOINT UPR EXTREM W/O DYE: CPT | Mod: LT

## 2025-02-10 ENCOUNTER — NON-APPOINTMENT (OUTPATIENT)
Age: 55
End: 2025-02-10

## 2025-02-10 VITALS — WEIGHT: 210 LBS | HEIGHT: 63 IN | BODY MASS INDEX: 37.21 KG/M2

## 2025-02-10 DIAGNOSIS — Z87.891 PERSONAL HISTORY OF NICOTINE DEPENDENCE: ICD-10-CM

## 2025-02-11 ENCOUNTER — APPOINTMENT (OUTPATIENT)
Dept: CT IMAGING | Facility: CLINIC | Age: 55
End: 2025-02-11
Payer: SELF-PAY

## 2025-02-11 PROCEDURE — 71250 CT THORAX DX C-: CPT
